# Patient Record
Sex: MALE | Race: WHITE | NOT HISPANIC OR LATINO | Employment: OTHER | URBAN - METROPOLITAN AREA
[De-identification: names, ages, dates, MRNs, and addresses within clinical notes are randomized per-mention and may not be internally consistent; named-entity substitution may affect disease eponyms.]

---

## 2018-04-18 ENCOUNTER — TRANSCRIBE ORDERS (OUTPATIENT)
Dept: ADMINISTRATIVE | Facility: HOSPITAL | Age: 73
End: 2018-04-18

## 2018-04-18 DIAGNOSIS — Z87.891 PERSONAL HISTORY OF TOBACCO USE, PRESENTING HAZARDS TO HEALTH: Primary | ICD-10-CM

## 2022-06-09 ENCOUNTER — TRANSCRIBE ORDERS (OUTPATIENT)
Dept: ADMINISTRATIVE | Facility: HOSPITAL | Age: 77
End: 2022-06-09

## 2022-06-09 DIAGNOSIS — R26.89 BALANCE DISORDER: Primary | ICD-10-CM

## 2022-06-28 ENCOUNTER — HOSPITAL ENCOUNTER (OUTPATIENT)
Dept: MRI IMAGING | Facility: HOSPITAL | Age: 77
Discharge: HOME | End: 2022-06-28
Admitting: INTERNAL MEDICINE

## 2022-06-28 DIAGNOSIS — R26.89 BALANCE DISORDER: ICD-10-CM

## 2022-06-28 PROCEDURE — 70551 MRI BRAIN STEM W/O DYE: CPT

## 2022-07-01 ENCOUNTER — OFFICE VISIT (OUTPATIENT)
Dept: NEUROLOGY | Facility: CLINIC | Age: 77
End: 2022-07-01

## 2022-07-01 VITALS
OXYGEN SATURATION: 95 % | DIASTOLIC BLOOD PRESSURE: 86 MMHG | WEIGHT: 179 LBS | RESPIRATION RATE: 18 BRPM | HEART RATE: 65 BPM | BODY MASS INDEX: 28.77 KG/M2 | HEIGHT: 66 IN | SYSTOLIC BLOOD PRESSURE: 132 MMHG

## 2022-07-01 DIAGNOSIS — R26.89 BALANCE PROBLEMS: Primary | ICD-10-CM

## 2022-07-01 DIAGNOSIS — R25.1 TREMOR: ICD-10-CM

## 2022-07-01 PROCEDURE — 99204 OFFICE O/P NEW MOD 45 MIN: CPT | Performed by: PSYCHIATRY & NEUROLOGY

## 2022-07-01 RX ORDER — HYDROCHLOROTHIAZIDE 25 MG/1
TABLET ORAL
COMMUNITY
Start: 2022-06-08

## 2022-07-01 RX ORDER — LOSARTAN POTASSIUM 100 MG/1
TABLET ORAL
COMMUNITY
Start: 2022-06-08

## 2022-07-01 RX ORDER — CITALOPRAM 20 MG/1
TABLET ORAL
COMMUNITY
Start: 2022-06-08

## 2022-07-01 RX ORDER — DICLOFENAC SODIUM 75 MG/1
TABLET, DELAYED RELEASE ORAL
COMMUNITY
Start: 2022-06-08

## 2022-07-01 RX ORDER — ATORVASTATIN CALCIUM 40 MG/1
TABLET, FILM COATED ORAL
COMMUNITY
Start: 2022-06-08

## 2022-07-01 NOTE — PROGRESS NOTES
Subjective:    CC: Riley Crawford is seen today in consultation at the request of Bushra Dhillon MD for new patient, balance disorder (Quick movements, sometimes randomly), and Shaking (Both hands)       HPI:  Patient is a 77-year-old male with past medical history of hypertension referred to the clinic for evaluation of problems with balance and tremors in both hands.  He reports that symptoms started approximately 6 months or so ago.  He reports that whenever he is walking, suddenly he catches himself and is difficult for him to stop.  He also reports that whenever he bends forward, he gets dizzy and may fall forward.  He has had couple of falls while bending forward.  In addition, he has noticed very subtle tremors in his right hand that would occur primarily when he is holding cup of coffee or glass of water sometimes while writing.  This is episodic and does not happen every day.  He is concerned because of family history of Parkinson's in his father.  He denies any change in sense of smell or taste.  He denies any episodes of acting out dreams at night.  Overall walking speed is fine without any changes.  No problems while turning.  No change in voice quality.    The following portions of the patient's history were reviewed today and updated as of 07/01/2022  : allergies, social history and problem list.  This document will be scanned to patient's chart.      Current Outpatient Medications:   •  atorvastatin (LIPITOR) 40 MG tablet, , Disp: , Rfl:   •  citalopram (CeleXA) 20 MG tablet, , Disp: , Rfl:   •  diclofenac (VOLTAREN) 75 MG EC tablet, , Disp: , Rfl:   •  hydroCHLOROthiazide (HYDRODIURIL) 25 MG tablet, , Disp: , Rfl:   •  losartan (COZAAR) 100 MG tablet, , Disp: , Rfl:    No past medical history on file.   No past surgical history on file.   No family history on file.   Review of Systems    All other systems reviewed and are negative     Objective:    /86   Pulse 65   Resp 18   Ht 167.6 cm  "(66\")   Wt 81.2 kg (179 lb)   SpO2 95%   BMI 28.89 kg/m²     Neurology Exam:    General apperance: NAD.     Mental status: Alert, awake and oriented to time place and person.    Recent and Remote memory: Can recall 3/3 objects at 5 minutes. Can recall historical events.     Attention span and Concentration: Serial 7s: Normal.     Fund of knowledge:  Normal.     Language and Speech: No aphasia or dysarthria.    Naming , Repitition and Comprehension:  Can name objects, repeat a sentence and follow commands. Speech is clear and fluent with good repetition, comprehension, and naming.    Cranial Nerves:   CN II: Visual fields are full. Intact. Fundi - Normal, No papillederma, Pupils - TELLY  CN III, IV and VI: Extraocular movements are intact. Normal saccades.   CN V: Facial sensation is intact.   CN VII: Muscles of facial expression reveal no asymmetry. Intact.   CN VIII: Hearing is intact. Whispered voice intact.   CN IX and X: Palate elevates symmetrically. Intact  CN XI: Shoulder shrug is intact.   CN XII: Tongue is midline without evidence of atrophy or fasciculation.     Motor:  Right UE muscle strength 5/5. Normal tone.     Left UE muscle strength 5/5. Normal tone.      Right LE muscle strength5/5. Normal tone.     Left LE muscle strength 5/5. Normal tone.      Sensory: Normal light touch, vibration and pinprick sensation bilaterally.    DTRs: 2+ bilaterally in upper and lower extremities.    Babinski: Negative bilaterally.    Co-ordination: Normal finger-to-nose, heel to shin B/L.    Rhomberg: Negative.    Gait: Normal.    Cardiovascular: Regular rate and rhythm without murmur, gallop or rub.    Ophthalmoscopic exam: Normal fundi, no papilledema.    Assessment and Plan:  1. Balance problems  2. Tremor  -I have assured him that he does not have Parkinson's.  I did not appreciate any tremors on my exam.  He was not noted to have any evidence of masked face, bradykinesia on my exam as well.  I have advised him " that not to bend over and if he has to do something while bending over then to use his chair or stool and do it while sitting on it rather than just doing it while standing.  I did offer gait and balance therapy but he currently wants to wait and he will call me if he is interested.  I have advised him to maintain good hydration and I will plan to see him back in clinic in as needed.       Return if symptoms worsen or fail to improve.     Zachary Pace MD

## 2022-09-07 ENCOUNTER — OFFICE VISIT (OUTPATIENT)
Dept: SLEEP MEDICINE | Facility: HOSPITAL | Age: 77
End: 2022-09-07

## 2022-09-07 VITALS
DIASTOLIC BLOOD PRESSURE: 67 MMHG | OXYGEN SATURATION: 97 % | HEART RATE: 82 BPM | SYSTOLIC BLOOD PRESSURE: 133 MMHG | WEIGHT: 178 LBS | BODY MASS INDEX: 28.61 KG/M2 | HEIGHT: 66 IN

## 2022-09-07 DIAGNOSIS — G47.33 OSA (OBSTRUCTIVE SLEEP APNEA): ICD-10-CM

## 2022-09-07 DIAGNOSIS — R06.83 SNORING: ICD-10-CM

## 2022-09-07 DIAGNOSIS — G47.19 EXCESSIVE DAYTIME SLEEPINESS: Primary | ICD-10-CM

## 2022-09-07 PROBLEM — I10 ESSENTIAL HYPERTENSION: Status: ACTIVE | Noted: 2022-09-07

## 2022-09-07 PROCEDURE — 99204 OFFICE O/P NEW MOD 45 MIN: CPT | Performed by: INTERNAL MEDICINE

## 2022-09-07 NOTE — PROGRESS NOTES
"  Riley Crawford is a 77 y.o. male.   Chief Complaint   Patient presents with   • Sleeping Problem       HPI     77 y.o. male seen in consultation at the request of Bushra Dhillon MD for evaluation of the above.     He has a 1-2-year history of increasing daytime somnolence.  His wife notes that he can fall asleep during the day if he is inactive.    He keeps a regular sleep schedule.  He averages about 9 hours of sleep per night in addition he takes an hour and a half nap.  He will awaken 1-3 times per night and go to sleep within 15 or 20 minutes.  He awakens with a dry mouth and sore throat.  His wife has not noted specific apneas.    Despite all the sleepy cats he can fall back asleep within an hour of awakening in the morning.    He denies any RLS symptoms.  He has no nocturnal hallucinations or sleep paralysis.  His weight has been relatively stable over the last several years.    [place] Scale is: 13/24    The patient's relevant past medical, surgical, family, and social history reviewed and updated in Epic as appropriate.    Current medications are:   Current Outpatient Medications:   •  atorvastatin (LIPITOR) 40 MG tablet, , Disp: , Rfl:   •  citalopram (CeleXA) 20 MG tablet, , Disp: , Rfl:   •  diclofenac (VOLTAREN) 75 MG EC tablet, , Disp: , Rfl:   •  hydroCHLOROthiazide (HYDRODIURIL) 25 MG tablet, , Disp: , Rfl:   •  losartan (COZAAR) 100 MG tablet, , Disp: , Rfl: .    Review of Systems    Review of Systems  ROS documented in patient questionnaire ×14 systems.  Reviewed with patient.  Otherwise negative except as noted in HPI.    Physical Exam    Blood pressure 133/67, pulse 82, height 167.6 cm (66\"), weight 80.7 kg (178 lb), SpO2 97 %. Body mass index is 28.73 kg/m².    Physical Exam  Vitals and nursing note reviewed.   Constitutional:       Appearance: Normal appearance. He is well-developed.   HENT:      Head: Normocephalic and atraumatic.      Nose: Nose normal.      Mouth/Throat:      Mouth: " Mucous membranes are moist.      Pharynx: Oropharynx is clear. No oropharyngeal exudate.      Comments: Class IV airway  Eyes:      General: No scleral icterus.     Conjunctiva/sclera: Conjunctivae normal.   Neck:      Thyroid: No thyromegaly.      Trachea: No tracheal deviation.   Cardiovascular:      Rate and Rhythm: Normal rate and regular rhythm.      Heart sounds: No murmur heard.    No friction rub. No gallop.   Pulmonary:      Effort: Pulmonary effort is normal. No respiratory distress.      Breath sounds: No wheezing or rales.   Musculoskeletal:         General: No deformity. Normal range of motion.   Skin:     General: Skin is warm and dry.      Findings: No rash.   Neurological:      Mental Status: He is alert and oriented to person, place, and time.   Psychiatric:         Behavior: Behavior normal.         Thought Content: Thought content normal.         DATA:    Reviewed 6/7/2022 note from Dr. Dhillon    Reviewed bed partner questionnaire and obtained history from his wife    ASSESSMENT:    Problem List Items Addressed This Visit        Pulmonary Problems    ELAN (obstructive sleep apnea)    Relevant Orders    Home Sleep Study    Snoring    Relevant Orders    Home Sleep Study       Other    Excessive daytime sleepiness - Primary    Relevant Orders    Home Sleep Study          77-year-old male with evidence of obstructive sleep apnea consisting of snoring and nonrestorative sleep.  He has fairly significant daytime somnolence despite sleeping 9 hours per night with an additional hour and a half nap in the afternoon.  He warrants further work-up for obstructive sleep apnea.  A home sleep apnea test would be an appropriate initial step.  If this is normal or nondiagnostic he needs further work-up for his significant daytime somnolence.    PLAN:    1. HSAT as described  2. I discussed the diagnostic process as well as the importance of diagnosing and treating obstructive sleep apnea, if present, to avoid  long-term complications.  3. I discussed treatment options and he was amenable to CPAP therapy if appropriate.  His wife is on CPAP.  4. Close sleep center follow-up    I have reviewed the results of my evaluation and impression and discussed my recommendations in detail with the patient.    Level of Risk Moderate due to: undiagnosed new problem     Moderate risk of morbidity due to the possibility of untreated sleep apnea.      Signed by  Sanjay Askew MD    September 7, 2022      CC: Bushra Dhillon MD Foxx, Amanda Rae, MD

## 2022-10-13 ENCOUNTER — HOSPITAL ENCOUNTER (OUTPATIENT)
Dept: SLEEP MEDICINE | Facility: HOSPITAL | Age: 77
Discharge: HOME | End: 2022-10-13
Admitting: INTERNAL MEDICINE

## 2022-10-13 VITALS — BODY MASS INDEX: 28.59 KG/M2 | WEIGHT: 177.91 LBS | HEIGHT: 66 IN

## 2022-10-13 DIAGNOSIS — R06.83 SNORING: ICD-10-CM

## 2022-10-13 DIAGNOSIS — G47.19 EXCESSIVE DAYTIME SLEEPINESS: ICD-10-CM

## 2022-10-13 DIAGNOSIS — G47.33 OSA (OBSTRUCTIVE SLEEP APNEA): ICD-10-CM

## 2022-10-13 PROCEDURE — 95806 SLEEP STUDY UNATT&RESP EFFT: CPT

## 2022-10-13 PROCEDURE — 95806 SLEEP STUDY UNATT&RESP EFFT: CPT | Performed by: INTERNAL MEDICINE

## 2022-10-18 DIAGNOSIS — G47.19 EXCESSIVE DAYTIME SLEEPINESS: ICD-10-CM

## 2022-10-18 DIAGNOSIS — I10 ESSENTIAL HYPERTENSION: ICD-10-CM

## 2022-10-18 DIAGNOSIS — G47.33 OSA (OBSTRUCTIVE SLEEP APNEA): Primary | ICD-10-CM

## 2022-10-18 DIAGNOSIS — R06.83 SNORING: ICD-10-CM

## 2022-10-19 ENCOUNTER — TELEPHONE (OUTPATIENT)
Dept: SLEEP MEDICINE | Facility: HOSPITAL | Age: 77
End: 2022-10-19

## 2022-10-19 NOTE — TELEPHONE ENCOUNTER
CALLED PATIENT TO ADVISE OF STUDY RESULTS. PATIENT VERBALIZED UNDERSTANDING AND WAS AGREEABLE TO PAP THERAPY

## 2022-10-19 NOTE — TELEPHONE ENCOUNTER
----- Message from Audelia Rojas sent at 10/18/2022  3:43 PM EDT -----  Regarding: RE: Positive HST Report  ORDER PENDED  ----- Message -----  From: Mary Gaona RPSGT, RRT  Sent: 10/17/2022  11:56 AM EDT  To: [facility] Clinical Pool  Subject: Positive HST Report                              Nightone report scanned to chart. AHI is 33.7/hr, an order for auto-pap is needed

## 2022-11-18 ENCOUNTER — TELEPHONE (OUTPATIENT)
Dept: SLEEP MEDICINE | Facility: HOSPITAL | Age: 77
End: 2022-11-18

## 2022-11-18 DIAGNOSIS — G47.33 OBSTRUCTIVE SLEEP APNEA, ADULT: Primary | ICD-10-CM

## 2022-11-18 NOTE — TELEPHONE ENCOUNTER
Caller: Riley Crawford    Relationship: Self    Best call back number: [phone]    What is the best time to reach you: ANYTIME    Who are you requesting to speak with (clinical staff, provider,  specific staff member): CLINICAL STAFF    What was the call regarding: PATIENT IS STATING THAT LADY WHO READS HIS COMPLIANCE REPORT AT PATIENT AID IS CONCERNED WITH HIS AHI. ADVISED TO GET IN CONTACT WITH PROVIDER AND GIVE UPDATE. WOULD LIKE TO SPEAK WITH SOMEONE IN OFFICE REGARDING THIS.    Do you require a callback: YES

## 2022-11-21 ENCOUNTER — TELEPHONE (OUTPATIENT)
Dept: SLEEP MEDICINE | Facility: HOSPITAL | Age: 77
End: 2022-11-21

## 2022-11-21 NOTE — TELEPHONE ENCOUNTER
If you are wanting to do a bipap rescue then you would need to enter order for bipap with settings without having to wait for a titration. We will fax to DME to get him setup. Please cancel order for study

## 2022-11-21 NOTE — TELEPHONE ENCOUNTER
Caller: Riley Crawford    Relationship to patient: Self    Best call back number: [phone]    Patient is needing: PATIENT REQUESTING TO SPEAK WITH THE CLINICAL STAFF TO PROVIDE RESULTS. REQUESTING CALL BACK.

## 2023-01-03 NOTE — PROGRESS NOTES
Sleep Clinic Follow Up Note    Chief Complaint  Follow-up    Subjective     History of Present Illness (from previous encounter on 9/7/2022):  77 y.o. male seen in consultation at the request of Bushra Dhillon MD for evaluation of the above.      He has a 1-2-year history of increasing daytime somnolence.  His wife notes that he can fall asleep during the day if he is inactive.     He keeps a regular sleep schedule.  He averages about 9 hours of sleep per night in addition he takes an hour and a half nap.  He will awaken 1-3 times per night and go to sleep within 15 or 20 minutes.  He awakens with a dry mouth and sore throat.  His wife has not noted specific apneas.     Despite all the sleepy cats he can fall back asleep within an hour of awakening in the morning.     He denies any RLS symptoms.  He has no nocturnal hallucinations or sleep paralysis.  His weight has been relatively stable over the last several years.     [place] Scale is: 13/24 (End copied text).    -A home sleep study was obtained on 10/14/2022 revealing severe obstructive sleep apnea.  PAP therapy was initiated.    -A titration study was ordered on 11/21/2022    Interval History:  Riley Crawford is a 77 y.o. male returns for follow up and compliance of CPAP therapy. The patient was last seen on 9/7/2022. Overall the patient feels poor with regard to therapy. The device appears to be working appropriately.     Further details are as follows:      [place] Scale is (out of 24): Total score: 14     Weight:  Current Weight: 176 lb    Weight change in the last year:  loss: 2 lbs    The patient's relevant past medical, surgical, family, and social history reviewed and updated in Epic as appropriate.    PMH:    Past Medical History:   Diagnosis Date   • Arthritis    • Dyslipidemia    • Hypertension      Past Surgical History:   Procedure Laterality Date   • TONSILLECTOMY     • WISDOM TOOTH EXTRACTION         No Known Allergies    MEDS:  Prior to  Admission medications    Medication Sig Start Date End Date Taking? Authorizing Provider   atorvastatin (LIPITOR) 40 MG tablet  6/8/22   Martha Do MD   citalopram (CeleXA) 20 MG tablet  6/8/22   Martha Do MD   diclofenac (VOLTAREN) 75 MG EC tablet  6/8/22   Martha Do MD   hydroCHLOROthiazide (HYDRODIURIL) 25 MG tablet  6/8/22   Martha Do MD   losartan (COZAAR) 100 MG tablet  6/8/22   Martha Do MD         FH:  Family History   Problem Relation Age of Onset   • Heart disease Mother    • Asthma Father    • Parkinsonism Father        Objective   Vital Signs:  /73   Pulse 63   Ht 167.6 cm (66\")   Wt 79.8 kg (176 lb)   SpO2 96%   BMI 28.41 kg/m²           Physical Exam  Vitals reviewed.   Constitutional:       Appearance: Normal appearance.   HENT:      Head: Normocephalic and atraumatic.      Nose: Nose normal.      Mouth/Throat:      Mouth: Mucous membranes are moist.   Cardiovascular:      Rate and Rhythm: Normal rate and regular rhythm.      Heart sounds: No murmur heard.    No friction rub. No gallop.   Pulmonary:      Effort: Pulmonary effort is normal. No respiratory distress.      Breath sounds: Normal breath sounds. No wheezing or rhonchi.   Neurological:      Mental Status: He is alert and oriented to person, place, and time.   Psychiatric:         Behavior: Behavior normal.         Mallampati Score: III (soft and hard palate and base of uvula visible)      CPAP Report:  AHI: 28.5/h  Days of Usage: 63/64 (98%)  Number of Days Greater than 4 hours: 62/64 (97%)  Average time (days used): 7 hours 52 minutes  95th Percentile Pressure: 10.8 cm H2O  Central apnea: 26.3, obstructive apnea 0.2.  Settings: Auto CPAP-8/18 cm H2O, EPR full-time, EPR level 3, response standard.      Result Review :              Assessment and Plan  Riley Crawford is a 77 y.o. male who returns for follow-up compliance of PAP therapy.  His overall compliance is at 98%  with greater than 4-hour usage at 97%.  Average usage is at 7 hours 52 minutes.  His AHI however is elevated at 28.5.  An order had been placed for titration study however this has not yet been scheduled.  I will reorder this today.  The patient was found with significant increase in central apneas on his compliance report.    I will refill the patient's supplies, and they will return for follow-up and compliance in 1 year or sooner should they have further questions or concerns.    Diagnoses and all orders for this visit:    1. ELAN (obstructive sleep apnea) (Primary)  -     PAP Therapy    2. Primary central sleep apnea  -     Polysomnography 4 or More Parameters With CPAP; Future           The patient continues to use and benefit from CPAP therapy.    1. The patient was counseled regarding multimodal approach with healthy nutrition, healthy sleep, regular physical activity, social activities, counseling, and medications. Encouraged to practice lateral sleep position. Avoid alcohol and sedatives close to bedtime.     2.  We will refill supplies x1 year.  Return to clinic 1 year or sooner if symptoms warrant. I have reviewed the results of my evaluation and impression and discussed my recommendations in detail with the patient.    Follow Up  Return for Follow up after study.  Patient was given instructions and counseling regarding his condition or for health maintenance advice. Please see specific information pulled into the AVS if appropriate.       SHERRIE Barger, ACNP-BC  Pulmonology, Critical Care, and Sleep Medicine

## 2023-01-04 ENCOUNTER — OFFICE VISIT (OUTPATIENT)
Dept: SLEEP MEDICINE | Facility: HOSPITAL | Age: 78
End: 2023-01-04
Payer: MEDICARE

## 2023-01-04 VITALS
SYSTOLIC BLOOD PRESSURE: 132 MMHG | BODY MASS INDEX: 28.28 KG/M2 | HEART RATE: 63 BPM | WEIGHT: 176 LBS | OXYGEN SATURATION: 96 % | DIASTOLIC BLOOD PRESSURE: 73 MMHG | HEIGHT: 66 IN

## 2023-01-04 DIAGNOSIS — G47.31 PRIMARY CENTRAL SLEEP APNEA: ICD-10-CM

## 2023-01-04 DIAGNOSIS — G47.33 OSA (OBSTRUCTIVE SLEEP APNEA): Primary | ICD-10-CM

## 2023-01-04 PROCEDURE — 1160F RVW MEDS BY RX/DR IN RCRD: CPT | Performed by: NURSE PRACTITIONER

## 2023-01-04 PROCEDURE — 1159F MED LIST DOCD IN RCRD: CPT | Performed by: NURSE PRACTITIONER

## 2023-01-04 PROCEDURE — 99213 OFFICE O/P EST LOW 20 MIN: CPT | Performed by: NURSE PRACTITIONER

## 2023-01-09 ENCOUNTER — TELEPHONE (OUTPATIENT)
Dept: SLEEP MEDICINE | Facility: HOSPITAL | Age: 78
End: 2023-01-09

## 2023-01-09 NOTE — TELEPHONE ENCOUNTER
Caller: Riley Crawford A    Relationship to patient: Self    Best call back number: [phone] (Mobile)    Patient is needing: PT IS CALLING TO FOLLOW UP ON APPT ON 1/4/23 WITH DR ANNE. HE WAS NOT SURE WHAT THE NEXT STEPS WERE. HE SAID IT SOUNDED LIKE HIS ISSUE WAS AN URGENT MATTER. HE IS REQUESTING SOMEONE CALL HIM BACK ASAP IN REGARD TO THIS.

## 2023-01-26 ENCOUNTER — HOSPITAL ENCOUNTER (OUTPATIENT)
Dept: SLEEP MEDICINE | Facility: HOSPITAL | Age: 78
Discharge: HOME | End: 2023-01-26
Admitting: NURSE PRACTITIONER
Payer: MEDICARE

## 2023-01-26 DIAGNOSIS — G47.31 PRIMARY CENTRAL SLEEP APNEA: ICD-10-CM

## 2023-01-26 PROCEDURE — 95811 POLYSOM 6/>YRS CPAP 4/> PARM: CPT | Performed by: INTERNAL MEDICINE

## 2023-01-26 PROCEDURE — 95811 POLYSOM 6/>YRS CPAP 4/> PARM: CPT

## 2023-01-27 VITALS — HEIGHT: 66 IN | WEIGHT: 176 LBS | BODY MASS INDEX: 28.28 KG/M2

## 2023-02-15 NOTE — PROGRESS NOTES
Sleep Clinic Follow Up Note    Chief Complaint  Follow-up    Subjective     History of Present Illness (from previous encounter on 1/4/2023):  Riley Crawford is a 77 y.o. male who returns for follow-up compliance of PAP therapy.  His overall compliance is at 98% with greater than 4-hour usage at 97%.  Average usage is at 7 hours 52 minutes.  His AHI however is elevated at 28.5.  An order had been placed for titration study however this has not yet been scheduled.  I will reorder this today.  The patient was found with significant increase in central apneas on his compliance report.  (End copied text)    -An in lab titration study was obtained on 1/27/2023 revealing complex sleep apnea partially treated with BPAP S/T, and nocturnal hypoxemia treated with PAP therapy.  Recommendation is for BPAP pressure of 20/16, with ST rate of 10.  Significant PLM's were also noted.    Interval History:  Riley Crawford is a 77 y.o. male who presents for follow-up after titration study.  Recommendation is for BiPAP S/T.  Patient was also found with significant PLM's with correlation of restless leg recommended.       Further details are as follows:    [place] Scale is: 18/24        The patient's relevant past medical, surgical, family, and social history reviewed and updated in Epic as appropriate.    PMH:    Past Medical History:   Diagnosis Date   • Arthritis    • Dyslipidemia    • Hypertension      Past Surgical History:   Procedure Laterality Date   • TONSILLECTOMY     • WISDOM TOOTH EXTRACTION         No Known Allergies    MEDS:  Prior to Admission medications    Medication Sig Start Date End Date Taking? Authorizing Provider   atorvastatin (LIPITOR) 40 MG tablet  6/8/22   Martha Do MD   citalopram (CeleXA) 20 MG tablet  6/8/22   Martha Do MD   diclofenac (VOLTAREN) 75 MG EC tablet  6/8/22   Martha Do MD   hydroCHLOROthiazide (HYDRODIURIL) 25 MG tablet  6/8/22   Matrha Do MD  "  losartan (COZAAR) 100 MG tablet  6/8/22   Provider, MD Martha         FH:  Family History   Problem Relation Age of Onset   • Heart disease Mother    • Asthma Father    • Parkinsonism Father        Objective   Vital Signs:  /73   Pulse 70   Ht 167.6 cm (66\")   Wt 78.9 kg (174 lb)   SpO2 96%   BMI 28.08 kg/m²           Physical Exam  Vitals reviewed.   Constitutional:       Appearance: Normal appearance.   HENT:      Head: Normocephalic and atraumatic.      Nose: Nose normal.      Mouth/Throat:      Mouth: Mucous membranes are moist.   Cardiovascular:      Rate and Rhythm: Normal rate and regular rhythm.      Heart sounds: No murmur heard.    No friction rub. No gallop.   Pulmonary:      Effort: Pulmonary effort is normal. No respiratory distress.      Breath sounds: Normal breath sounds. No wheezing or rhonchi.   Neurological:      Mental Status: He is alert and oriented to person, place, and time.   Psychiatric:         Behavior: Behavior normal.             Result Review :              Assessment and Plan  Riley Crawford is a 77 y.o. male returns for follow-up after titration study.  Patient was found with complex sleep apnea treated with BiPAP S/T.  Additionally, he was found with nocturnal hypoxemia which was treated with PAP therapy.  Recommendation is for initiation of BiPAP ST with a pressure of 20/16, and ST rate of 10.  If symptoms do not improve further discussion and need for ASV titration is recommended if cardiac function is conducive to this mode of therapy.    Unfortunately, I will have to order a new device as his current device will not be able to be reprogrammed for BiPAP settings.  Patient and his wife have significant concerns which I share regarding his complex sleep apnea.  Hopefully we can get a new device with the recommended pressure settings obtained as soon as possible.  The patient will return for follow-up in a short amount of time for recheck after initiation of " device and pressure changes.  Patient's wife notes that he has significant movement in all of his extremities noxious legs.  She is concerned that this is related to his complex sleep apnea.    Diagnoses and all orders for this visit:    1. Complex sleep apnea syndrome (Primary)  -     PAP Therapy    2. Primary central sleep apnea  -     PAP Therapy    3. ELAN (obstructive sleep apnea)  -     PAP Therapy                Follow Up  Return in about 4 weeks (around 3/16/2023).  Patient was given instructions and counseling regarding his condition or for health maintenance advice. Please see specific information pulled into the AVS if appropriate.       SHERRIE Barger, ACNP-BC  Pulmonology, Critical Care, and Sleep Medicine

## 2023-02-16 ENCOUNTER — OFFICE VISIT (OUTPATIENT)
Dept: SLEEP MEDICINE | Facility: HOSPITAL | Age: 78
End: 2023-02-16
Payer: MEDICARE

## 2023-02-16 VITALS
HEART RATE: 70 BPM | OXYGEN SATURATION: 96 % | SYSTOLIC BLOOD PRESSURE: 132 MMHG | BODY MASS INDEX: 27.97 KG/M2 | WEIGHT: 174 LBS | DIASTOLIC BLOOD PRESSURE: 73 MMHG | HEIGHT: 66 IN

## 2023-02-16 DIAGNOSIS — G47.33 OSA (OBSTRUCTIVE SLEEP APNEA): ICD-10-CM

## 2023-02-16 DIAGNOSIS — G47.31 COMPLEX SLEEP APNEA SYNDROME: Primary | ICD-10-CM

## 2023-02-16 DIAGNOSIS — G47.31 PRIMARY CENTRAL SLEEP APNEA: ICD-10-CM

## 2023-02-16 PROCEDURE — 99213 OFFICE O/P EST LOW 20 MIN: CPT | Performed by: NURSE PRACTITIONER

## 2023-03-15 ENCOUNTER — TELEPHONE (OUTPATIENT)
Dept: SLEEP MEDICINE | Facility: HOSPITAL | Age: 78
End: 2023-03-15

## 2023-03-15 NOTE — TELEPHONE ENCOUNTER
"  Caller: Riley Crawford \"Lamont\"    Relationship: Self    Best call back number: [phone]    What is the best time to reach you: ANYTIME BETWEEN 12 NOON AND 4PM    Who are you requesting to speak with (clinical staff, provider,  specific staff member): CLINICAL STAFF      What was the call regarding: PT STATES SETTINGS ON BIPAP ARE WRONG. KEEPS BLOWING MASK TO THE SIDE. KEEPS HIMSELF AND HIS WIFE AWAKE EVERY NIGHT. HE HAS USED NUMEROUS MASKS TO NO AVAIL. PT ADVISES HE NEEDS HELP NOW ASAP    Do you require a callback: YES, PLEASE          "

## 2023-03-16 DIAGNOSIS — G47.33 OBSTRUCTIVE SLEEP APNEA, ADULT: Primary | ICD-10-CM

## 2023-04-17 ENCOUNTER — OFFICE VISIT (OUTPATIENT)
Dept: SLEEP MEDICINE | Facility: HOSPITAL | Age: 78
End: 2023-04-17
Payer: MEDICARE

## 2023-04-17 VITALS
DIASTOLIC BLOOD PRESSURE: 67 MMHG | OXYGEN SATURATION: 94 % | HEIGHT: 66 IN | HEART RATE: 62 BPM | WEIGHT: 174 LBS | BODY MASS INDEX: 27.97 KG/M2 | SYSTOLIC BLOOD PRESSURE: 116 MMHG

## 2023-04-17 DIAGNOSIS — R06.83 SNORING: ICD-10-CM

## 2023-04-17 DIAGNOSIS — G47.19 EXCESSIVE DAYTIME SLEEPINESS: Primary | ICD-10-CM

## 2023-04-17 DIAGNOSIS — G47.31 COMPLEX SLEEP APNEA SYNDROME: ICD-10-CM

## 2023-04-17 DIAGNOSIS — G47.31 PRIMARY CENTRAL SLEEP APNEA: ICD-10-CM

## 2023-04-17 PROCEDURE — 1160F RVW MEDS BY RX/DR IN RCRD: CPT | Performed by: INTERNAL MEDICINE

## 2023-04-17 PROCEDURE — 99214 OFFICE O/P EST MOD 30 MIN: CPT | Performed by: INTERNAL MEDICINE

## 2023-04-17 PROCEDURE — 1159F MED LIST DOCD IN RCRD: CPT | Performed by: INTERNAL MEDICINE

## 2023-04-17 PROCEDURE — 3074F SYST BP LT 130 MM HG: CPT | Performed by: INTERNAL MEDICINE

## 2023-04-17 PROCEDURE — 3078F DIAST BP <80 MM HG: CPT | Performed by: INTERNAL MEDICINE

## 2023-04-17 RX ORDER — FLUTICASONE PROPIONATE 50 MCG
SPRAY, SUSPENSION (ML) NASAL
COMMUNITY
Start: 2023-02-20

## 2023-04-17 NOTE — PROGRESS NOTES
Subjective:     Chief Complaint:   Chief Complaint   Patient presents with   • Follow-up       HPI:    Riley Crawford is a 77 y.o. male here for follow-up of complex sleep apnea.    At the time of his initial consultation with me in September 2022 he presented with snoring, witnessed apneas, and daytime somnolence.  An HSAT in October 2022 revealed an AHI of 33.2 with predominantly obstructive events.    He was placed on auto CPAP therapy range of 8-18 cm H2O and on a follow-up download his AHI was 25.9 of which the majority of these events were central apneas.  He did not feel improved on therapy.  A titration study was therefore ordered.  He performed that in January 2023.  This was inconclusive and he was placed on BiPAP ST with a rate of 10 and pressures of 20/16.  These pressures proved excessive and therefore his pressures were reduced to 16/14 with a backup rate of 10 in March of this year.    Since then he has been able to wear his BiPAP more consistently but does not note that he feels any better during the day.  His AHI remains elevated at 24.6.  These events were deemed hypopneas by his download.    Further details are as follows:    [place] Scale scored as 17/24.    Type of mask: full face mask      Current medications are:   Current Outpatient Medications:   •  atorvastatin (LIPITOR) 40 MG tablet, , Disp: , Rfl:   •  citalopram (CeleXA) 20 MG tablet, , Disp: , Rfl:   •  diclofenac (VOLTAREN) 75 MG EC tablet, , Disp: , Rfl:   •  fluticasone (FLONASE) 50 MCG/ACT nasal spray, , Disp: , Rfl:   •  hydroCHLOROthiazide (HYDRODIURIL) 25 MG tablet, , Disp: , Rfl:   •  losartan (COZAAR) 100 MG tablet, , Disp: , Rfl: .    The patient's relevant past medical, surgical, family and social history were reviewed and updated in Epic as appropriate.     ROS:    Review of Systems      Objective:    Physical Exam  Vitals reviewed.   Constitutional:       Appearance: He is well-developed.   HENT:      Head:  Normocephalic and atraumatic.      Mouth/Throat:      Mouth: Mucous membranes are moist.      Pharynx: Oropharynx is clear.      Comments: Class IV airway  Neck:      Thyroid: No thyromegaly.   Cardiovascular:      Rate and Rhythm: Normal rate and regular rhythm.      Heart sounds: No murmur heard.    No friction rub. No gallop.   Pulmonary:      Effort: Pulmonary effort is normal. No respiratory distress.      Breath sounds: No wheezing or rales.   Musculoskeletal:      Cervical back: Neck supple.   Skin:     General: Skin is warm and dry.   Neurological:      Mental Status: He is alert and oriented to person, place, and time.   Psychiatric:         Behavior: Behavior normal.         Thought Content: Thought content normal.         Data:    Patient's PAP download was personally interpreted by me and has not otherwise been independently reported.    PAP download findings:  Average pressure: 16/14  Average AHI:  25  Average minutes in large leak per night: low    Assessment:    Problem List Items Addressed This Visit        Pulmonary Problems    Complex sleep apnea syndrome    Snoring       Other    Excessive daytime sleepiness - Primary       1. Complex sleep apnea: He continues to have a high number of events on BiPAP ST therapy.  His prior downloads and titration revealed a predominance of central apneas.  He has ongoing daytime somnolence and a high AHI despite BiPAP ST  2. Excessive daytime somnolence: Ongoing and unimproved with therapy so far    Plan:     1. He will likely need BiPAP ASV and will need a titration to qualify for this.  I will order this  2. He has no history of heart failure or heart disease and I do not think he warrants an echocardiogram  3. I discussed all this in detail with the patient and his wife and went over his prior studies and downloads with them and answered all questions.  He is agreeable to proceed as I recommended.  4. I see no reason that he needs to use his current device as  it is not affording him much, if any, benefit symptomatically or with a reduction in his AHI.  He can resume therapy after his titration with a BiPAP ASV if it is deemed to be effective.      Discussed in detail with the patient.  He will call prior to his follow up visit for any new problems.    Level of service justified based on 32 minutes spent in patient care on this date of service including, but not limited to: preparing to see the patient, obtaining and/or reviewing history, performing medically appropriate examination, ordering tests/medicine/procedures, independently interpreting results, documenting clinical information in EHR, and counseling/education of patient/family/caregiver.  This is exclusive of time spent on other separate services such as performing procedures or test interpretation, if applicable.  (Level 4 30-39 minutes; Level 5 40-54 minutes)      Signed by  Sanjay Askew MD

## 2023-04-26 ENCOUNTER — TELEPHONE (OUTPATIENT)
Dept: SLEEP MEDICINE | Facility: HOSPITAL | Age: 78
End: 2023-04-26

## 2023-04-26 NOTE — TELEPHONE ENCOUNTER
DELETE AFTER REVIEWING: Telephone encounter to be sent to the clinical pool     Caller: BRIELLE    Relationship: Other    Best call back number: [phone]    What orders are you requesting (i.e. lab or imaging): ORDER FOR SLEEP STUDY      Where will you receive your lab/imaging services: [facility]    Additional notes: [facility] SCHEDULING CALLED REQUESTING AN ORDER FOR A SLEEP STUDY FOR THIS PATIENT.  STATED THE PATIENT WOULD PREFER TO HAVE THE SLEEP STUDY DONE IN [place] INSTEAD OF TRAVELING TO [place].    FAX NUMBER:  [phone] (ATTN BRIELLE)

## 2023-06-06 DIAGNOSIS — G47.33 OBSTRUCTIVE SLEEP APNEA, ADULT: ICD-10-CM

## 2023-06-06 DIAGNOSIS — G47.31 COMPLEX SLEEP APNEA SYNDROME: Primary | ICD-10-CM

## 2023-06-06 DIAGNOSIS — G47.19 EXCESSIVE DAYTIME SLEEPINESS: ICD-10-CM

## 2023-06-12 ENCOUNTER — TELEPHONE (OUTPATIENT)
Dept: OTHER | Facility: OTHER | Age: 78
End: 2023-06-12

## 2023-06-12 NOTE — TELEPHONE ENCOUNTER
"Caller: Riley Crawford \"Lamont\"    Relationship: Self    Best call back number: [phone]     What orders are you requesting (i.e. lab or imaging): BI PAP MACHINE       Additional notes: PATIENT STATES AN ORDER WAS TO BE PLACED WITH [facility] FOR BI-PAP MACHINE AND [facility] HAS NOT RECEIVED ANY ORDERS. IF ANY ADDITIONAL INFORMATION IS NEEDED PLEASE CALL PATIENT.      "

## 2023-06-13 NOTE — TELEPHONE ENCOUNTER
JOE FROM [facility] RETURNED MY CALL. HE WILL LOOK FOR THE FAX AND STATES HE CAN PULL IT FROM Epic IF HE CAN'T FIND IT.

## 2023-08-23 ENCOUNTER — OFFICE VISIT (OUTPATIENT)
Dept: SLEEP MEDICINE | Facility: HOSPITAL | Age: 78
End: 2023-08-23
Payer: MEDICARE

## 2023-08-23 VITALS
OXYGEN SATURATION: 98 % | DIASTOLIC BLOOD PRESSURE: 80 MMHG | HEART RATE: 58 BPM | HEIGHT: 66 IN | SYSTOLIC BLOOD PRESSURE: 127 MMHG | WEIGHT: 172.4 LBS | BODY MASS INDEX: 27.71 KG/M2

## 2023-08-23 DIAGNOSIS — G47.33 OSA (OBSTRUCTIVE SLEEP APNEA): Primary | ICD-10-CM

## 2023-08-23 DIAGNOSIS — G47.19 EXCESSIVE DAYTIME SLEEPINESS: ICD-10-CM

## 2023-08-23 DIAGNOSIS — G47.31 COMPLEX SLEEP APNEA SYNDROME: ICD-10-CM

## 2023-08-23 RX ORDER — OMEPRAZOLE 20 MG/1
CAPSULE, DELAYED RELEASE ORAL
COMMUNITY
Start: 2023-07-16

## 2023-08-23 NOTE — PROGRESS NOTES
Subjective:     Chief Complaint:   Chief Complaint   Patient presents with    Follow-up       HPI:    Riley Crawford is a 78 y.o. male here for follow-up of complex sleep apnea.    At the time of his initial consultation with me in September 2022 he presented with snoring, witnessed apneas, and daytime somnolence.  An HSAT in October 2022 revealed an AHI of 33.2 with predominantly obstructive events.    He was placed on auto CPAP therapy range of 8-18 cm H2O and on a follow-up download his AHI was 25.9 of which the majority of these events were central apneas.  He did not feel improved on therapy.  A titration study was therefore ordered.  He performed that in January 2023.  This was inconclusive and he was placed on BiPAP ST with a rate of 10 and pressures of 20/16.  These pressures proved excessive and therefore his pressures were reduced to 16/14 with a backup rate of 10 in March 2023.    At the time of his follow-up in April 2023 he did not feel any better during the day and his AHI remained elevated at 24.6    He subsequently underwent a BiPAP ASV titration.  I reviewed his download today.  His average pressure is 19/13.  His leak is low.  Compliance is excellent and AHI is 0.2.    He feels improved and his wife agrees that he is sleeping better and is less somnolent during the day    Further details are as follows:    [place] Scale scored as 13/24.    Type of mask: full face mask      Current medications are:   Current Outpatient Medications:     atorvastatin (LIPITOR) 40 MG tablet, , Disp: , Rfl:     citalopram (CeleXA) 20 MG tablet, , Disp: , Rfl:     diclofenac (VOLTAREN) 75 MG EC tablet, , Disp: , Rfl:     fluticasone (FLONASE) 50 MCG/ACT nasal spray, , Disp: , Rfl:     hydroCHLOROthiazide (HYDRODIURIL) 25 MG tablet, , Disp: , Rfl:     losartan (COZAAR) 100 MG tablet, , Disp: , Rfl:     omeprazole (priLOSEC) 20 MG capsule, , Disp: , Rfl: .    The patient's relevant past medical, surgical, family and  social history were reviewed and updated in Epic as appropriate.     ROS:    Review of Systems      Objective:    Physical Exam  Vitals reviewed.   Constitutional:       Appearance: He is well-developed.   HENT:      Head: Normocephalic and atraumatic.      Mouth/Throat:      Mouth: Mucous membranes are moist.      Pharynx: Oropharynx is clear.      Comments: Class IV airway  Neck:      Thyroid: No thyromegaly.   Cardiovascular:      Rate and Rhythm: Normal rate and regular rhythm.      Heart sounds: No murmur heard.    No friction rub. No gallop.   Pulmonary:      Effort: Pulmonary effort is normal. No respiratory distress.      Breath sounds: No wheezing or rales.   Musculoskeletal:      Cervical back: Neck supple.   Skin:     General: Skin is warm and dry.   Neurological:      Mental Status: He is alert and oriented to person, place, and time.   Psychiatric:         Behavior: Behavior normal.         Thought Content: Thought content normal.       Data:    Patient's PAP download was personally interpreted by me and has not otherwise been independently reported.    PAP download findings:  Average pressure: 19/13  Average AHI:  0.2  Average minutes in large leak per night: low    Assessment:    Problem List Items Addressed This Visit          Pulmonary Problems    Complex sleep apnea syndrome       Other    Excessive daytime sleepiness    Overview     Improved on BIPAP ASV          Other Visit Diagnoses       ELAN (obstructive sleep apnea)    -  Primary    Relevant Orders    PAP Therapy            Complex sleep apnea: Improved on BiPAP ASV with a low AHI on his download.  Mask leak is low.  Compliance is excellent.  Excessive daytime somnolence: Improved    Plan:     No change in BiPAP ASV settings  I renewed his supplies for the next year  We discussed ways to prevent what residual mask leak he is experiencing  Routine follow-up      Discussed in detail with the patient.  He will call prior to his follow up visit  for any new problems.      Signed by  Sanjay Askew MD

## 2024-01-10 ENCOUNTER — TRANSCRIBE ORDERS (OUTPATIENT)
Dept: ADMINISTRATIVE | Facility: HOSPITAL | Age: 79
End: 2024-01-10
Payer: MEDICARE

## 2024-01-10 ENCOUNTER — HOSPITAL ENCOUNTER (OUTPATIENT)
Dept: GENERAL RADIOLOGY | Facility: HOSPITAL | Age: 79
Discharge: HOME | End: 2024-01-10
Admitting: INTERNAL MEDICINE
Payer: MEDICARE

## 2024-01-10 DIAGNOSIS — M50.10 CERVICAL DISC SYNDROME: ICD-10-CM

## 2024-01-10 DIAGNOSIS — M50.10 CERVICAL DISC SYNDROME: Primary | ICD-10-CM

## 2024-01-10 PROCEDURE — 72050 X-RAY EXAM NECK SPINE 4/5VWS: CPT

## 2024-09-18 ENCOUNTER — OFFICE VISIT (OUTPATIENT)
Dept: SLEEP MEDICINE | Age: 79
End: 2024-09-18
Payer: MEDICARE

## 2024-09-18 VITALS
BODY MASS INDEX: 27.43 KG/M2 | TEMPERATURE: 96.3 F | WEIGHT: 170.7 LBS | HEIGHT: 66 IN | DIASTOLIC BLOOD PRESSURE: 78 MMHG | SYSTOLIC BLOOD PRESSURE: 128 MMHG | HEART RATE: 58 BPM

## 2024-09-18 DIAGNOSIS — G47.61 PERIODIC LIMB MOVEMENT DISORDER (PLMD): ICD-10-CM

## 2024-09-18 DIAGNOSIS — G47.31 COMPLEX SLEEP APNEA SYNDROME: Primary | ICD-10-CM

## 2024-09-18 DIAGNOSIS — G47.19 EXCESSIVE DAYTIME SLEEPINESS: ICD-10-CM

## 2024-09-18 RX ORDER — ROPINIROLE 0.5 MG/1
1 TABLET, FILM COATED ORAL NIGHTLY
Qty: 60 TABLET | Refills: 5 | Status: SHIPPED | OUTPATIENT
Start: 2024-09-18

## 2024-09-18 RX ORDER — HYDROCHLOROTHIAZIDE 12.5 MG/1
12.5 CAPSULE ORAL DAILY
COMMUNITY

## 2024-09-18 RX ORDER — DOXAZOSIN 4 MG/1
4 TABLET ORAL
COMMUNITY
Start: 2024-09-04